# Patient Record
Sex: MALE | Race: WHITE | NOT HISPANIC OR LATINO | URBAN - METROPOLITAN AREA
[De-identification: names, ages, dates, MRNs, and addresses within clinical notes are randomized per-mention and may not be internally consistent; named-entity substitution may affect disease eponyms.]

---

## 2019-06-29 ENCOUNTER — INPATIENT (INPATIENT)
Facility: HOSPITAL | Age: 34
LOS: 1 days | Discharge: HOME | DRG: 638 | End: 2019-07-01
Attending: INTERNAL MEDICINE | Admitting: INTERNAL MEDICINE
Payer: SELF-PAY

## 2019-06-29 VITALS
HEART RATE: 128 BPM | RESPIRATION RATE: 16 BRPM | TEMPERATURE: 98 F | DIASTOLIC BLOOD PRESSURE: 77 MMHG | WEIGHT: 139.99 LBS | HEIGHT: 65 IN | OXYGEN SATURATION: 100 % | SYSTOLIC BLOOD PRESSURE: 116 MMHG

## 2019-06-29 DIAGNOSIS — E11.9 TYPE 2 DIABETES MELLITUS WITHOUT COMPLICATIONS: ICD-10-CM

## 2019-06-29 LAB
ALBUMIN SERPL ELPH-MCNC: 3.7 G/DL — SIGNIFICANT CHANGE UP (ref 3.3–5.2)
ALP SERPL-CCNC: 126 U/L — HIGH (ref 40–120)
ALT FLD-CCNC: 18 U/L — SIGNIFICANT CHANGE UP
ANION GAP SERPL CALC-SCNC: 24 MMOL/L — HIGH (ref 5–17)
ANION GAP SERPL CALC-SCNC: 46 MMOL/L — HIGH (ref 5–17)
APPEARANCE UR: CLEAR — SIGNIFICANT CHANGE UP
AST SERPL-CCNC: 14 U/L — SIGNIFICANT CHANGE UP
BACTERIA # UR AUTO: ABNORMAL
BASE EXCESS BLDV CALC-SCNC: -14.3 MMOL/L — LOW (ref -2–2)
BASOPHILS # BLD AUTO: 0 K/UL — SIGNIFICANT CHANGE UP (ref 0–0.2)
BASOPHILS NFR BLD AUTO: 0 % — SIGNIFICANT CHANGE UP (ref 0–2)
BILIRUB SERPL-MCNC: 0.9 MG/DL — SIGNIFICANT CHANGE UP (ref 0.4–2)
BILIRUB UR-MCNC: NEGATIVE — SIGNIFICANT CHANGE UP
BUN SERPL-MCNC: 54 MG/DL — HIGH (ref 8–20)
BUN SERPL-MCNC: 75 MG/DL — HIGH (ref 8–20)
CA-I SERPL-SCNC: 0.74 MMOL/L — LOW (ref 1.15–1.33)
CALCIUM SERPL-MCNC: 7.8 MG/DL — LOW (ref 8.6–10.2)
CALCIUM SERPL-MCNC: 7.8 MG/DL — LOW (ref 8.6–10.2)
CHLORIDE BLDV-SCNC: 63 MMOL/L — LOW (ref 98–107)
CHLORIDE SERPL-SCNC: 70 MMOL/L — LOW (ref 98–107)
CHLORIDE SERPL-SCNC: 92 MMOL/L — LOW (ref 98–107)
CO2 SERPL-SCNC: 20 MMOL/L — LOW (ref 22–29)
CO2 SERPL-SCNC: 8 MMOL/L — CRITICAL LOW (ref 22–29)
COLOR SPEC: YELLOW — SIGNIFICANT CHANGE UP
CREAT SERPL-MCNC: 1.52 MG/DL — HIGH (ref 0.5–1.3)
CREAT SERPL-MCNC: 2.07 MG/DL — HIGH (ref 0.5–1.3)
DIFF PNL FLD: ABNORMAL
EOSINOPHIL # BLD AUTO: 0.21 K/UL — SIGNIFICANT CHANGE UP (ref 0–0.5)
EOSINOPHIL NFR BLD AUTO: 0.9 % — SIGNIFICANT CHANGE UP (ref 0–6)
EPI CELLS # UR: SIGNIFICANT CHANGE UP
GAS PNL BLDV: 117 MMOL/L — CRITICAL LOW (ref 135–145)
GAS PNL BLDV: SIGNIFICANT CHANGE UP
GAS PNL BLDV: SIGNIFICANT CHANGE UP
GLUCOSE BLDC GLUCOMTR-MCNC: 216 MG/DL — HIGH (ref 70–99)
GLUCOSE BLDC GLUCOMTR-MCNC: 218 MG/DL — HIGH (ref 70–99)
GLUCOSE BLDC GLUCOMTR-MCNC: 239 MG/DL — HIGH (ref 70–99)
GLUCOSE BLDC GLUCOMTR-MCNC: 265 MG/DL — HIGH (ref 70–99)
GLUCOSE BLDC GLUCOMTR-MCNC: 366 MG/DL — HIGH (ref 70–99)
GLUCOSE BLDC GLUCOMTR-MCNC: 382 MG/DL — HIGH (ref 70–99)
GLUCOSE BLDV-MCNC: 693 MG/DL — CRITICAL HIGH (ref 70–99)
GLUCOSE SERPL-MCNC: 255 MG/DL — HIGH (ref 70–115)
GLUCOSE SERPL-MCNC: 646 MG/DL — CRITICAL HIGH (ref 70–115)
GLUCOSE UR QL: 1000 MG/DL
HCO3 BLDV-SCNC: 14 MMOL/L — LOW (ref 21–29)
HCT VFR BLD CALC: 47.3 % — SIGNIFICANT CHANGE UP (ref 39–50)
HGB BLD-MCNC: 15.7 G/DL — SIGNIFICANT CHANGE UP (ref 13–17)
KETONES UR-MCNC: ABNORMAL
LACTATE BLDV-MCNC: 3 MMOL/L — HIGH (ref 0.5–2)
LACTATE BLDV-MCNC: 3.9 MMOL/L — HIGH (ref 0.5–2)
LEUKOCYTE ESTERASE UR-ACNC: NEGATIVE — SIGNIFICANT CHANGE UP
LIDOCAIN IGE QN: 10 U/L — LOW (ref 22–51)
LYMPHOCYTES # BLD AUTO: 1.23 K/UL — SIGNIFICANT CHANGE UP (ref 1–3.3)
LYMPHOCYTES # BLD AUTO: 5.3 % — LOW (ref 13–44)
MAGNESIUM SERPL-MCNC: 2.6 MG/DL — SIGNIFICANT CHANGE UP (ref 1.6–2.6)
MAGNESIUM SERPL-MCNC: 2.7 MG/DL — HIGH (ref 1.6–2.6)
MANUAL SMEAR VERIFICATION: SIGNIFICANT CHANGE UP
MCHC RBC-ENTMCNC: 29.5 PG — SIGNIFICANT CHANGE UP (ref 27–34)
MCHC RBC-ENTMCNC: 33.2 GM/DL — SIGNIFICANT CHANGE UP (ref 32–36)
MCV RBC AUTO: 88.9 FL — SIGNIFICANT CHANGE UP (ref 80–100)
MONOCYTES # BLD AUTO: 1.03 K/UL — HIGH (ref 0–0.9)
MONOCYTES NFR BLD AUTO: 4.4 % — SIGNIFICANT CHANGE UP (ref 2–14)
NEUTROPHILS # BLD AUTO: 20.62 K/UL — HIGH (ref 1.8–7.4)
NEUTROPHILS NFR BLD AUTO: 82.4 % — HIGH (ref 43–77)
NEUTS BAND # BLD: 6.1 % — SIGNIFICANT CHANGE UP (ref 0–8)
NITRITE UR-MCNC: POSITIVE
PCO2 BLDV: 21 MMHG — LOW (ref 35–50)
PH BLDV: 7.29 — LOW (ref 7.32–7.43)
PH UR: 6 — SIGNIFICANT CHANGE UP (ref 5–8)
PHOSPHATE SERPL-MCNC: 0.9 MG/DL — CRITICAL LOW (ref 2.4–4.7)
PHOSPHATE SERPL-MCNC: 6.3 MG/DL — HIGH (ref 2.4–4.7)
PLAT MORPH BLD: NORMAL — SIGNIFICANT CHANGE UP
PLATELET # BLD AUTO: 386 K/UL — SIGNIFICANT CHANGE UP (ref 150–400)
PO2 BLDV: 123 MMHG — HIGH (ref 25–45)
POIKILOCYTOSIS BLD QL AUTO: SIGNIFICANT CHANGE UP
POTASSIUM BLDV-SCNC: 11.6 MMOL/L — CRITICAL HIGH (ref 3.4–4.5)
POTASSIUM SERPL-MCNC: 3.6 MMOL/L — SIGNIFICANT CHANGE UP (ref 3.5–5.3)
POTASSIUM SERPL-MCNC: 4 MMOL/L — SIGNIFICANT CHANGE UP (ref 3.5–5.3)
POTASSIUM SERPL-SCNC: 3.6 MMOL/L — SIGNIFICANT CHANGE UP (ref 3.5–5.3)
POTASSIUM SERPL-SCNC: 4 MMOL/L — SIGNIFICANT CHANGE UP (ref 3.5–5.3)
PROT SERPL-MCNC: 6.2 G/DL — LOW (ref 6.6–8.7)
PROT UR-MCNC: 30 MG/DL
RBC # BLD: 5.32 M/UL — SIGNIFICANT CHANGE UP (ref 4.2–5.8)
RBC # FLD: 11.8 % — SIGNIFICANT CHANGE UP (ref 10.3–14.5)
RBC BLD AUTO: ABNORMAL
RBC CASTS # UR COMP ASSIST: SIGNIFICANT CHANGE UP /HPF (ref 0–4)
SAO2 % BLDV: 98 % — SIGNIFICANT CHANGE UP
SODIUM SERPL-SCNC: 124 MMOL/L — LOW (ref 135–145)
SODIUM SERPL-SCNC: 136 MMOL/L — SIGNIFICANT CHANGE UP (ref 135–145)
SP GR SPEC: 1.02 — SIGNIFICANT CHANGE UP (ref 1.01–1.02)
UROBILINOGEN FLD QL: NEGATIVE MG/DL — SIGNIFICANT CHANGE UP
VARIANT LYMPHS # BLD: 0.9 % — SIGNIFICANT CHANGE UP (ref 0–6)
WBC # BLD: 23.3 K/UL — HIGH (ref 3.8–10.5)
WBC # FLD AUTO: 23.3 K/UL — HIGH (ref 3.8–10.5)
WBC UR QL: NEGATIVE — SIGNIFICANT CHANGE UP

## 2019-06-29 PROCEDURE — 71045 X-RAY EXAM CHEST 1 VIEW: CPT | Mod: 26

## 2019-06-29 PROCEDURE — 99291 CRITICAL CARE FIRST HOUR: CPT

## 2019-06-29 RX ORDER — INSULIN HUMAN 100 [IU]/ML
8 INJECTION, SOLUTION SUBCUTANEOUS
Qty: 100 | Refills: 0 | Status: DISCONTINUED | OUTPATIENT
Start: 2019-06-29 | End: 2019-06-29

## 2019-06-29 RX ORDER — ONDANSETRON 8 MG/1
8 TABLET, FILM COATED ORAL ONCE
Refills: 0 | Status: COMPLETED | OUTPATIENT
Start: 2019-06-29 | End: 2019-06-29

## 2019-06-29 RX ORDER — POTASSIUM CHLORIDE 20 MEQ
10 PACKET (EA) ORAL
Refills: 0 | Status: COMPLETED | OUTPATIENT
Start: 2019-06-29 | End: 2019-06-29

## 2019-06-29 RX ORDER — INSULIN HUMAN 100 [IU]/ML
4 INJECTION, SOLUTION SUBCUTANEOUS
Qty: 100 | Refills: 0 | Status: DISCONTINUED | OUTPATIENT
Start: 2019-06-29 | End: 2019-06-30

## 2019-06-29 RX ORDER — HEPARIN SODIUM 5000 [USP'U]/ML
5000 INJECTION INTRAVENOUS; SUBCUTANEOUS EVERY 12 HOURS
Refills: 0 | Status: DISCONTINUED | OUTPATIENT
Start: 2019-06-29 | End: 2019-07-01

## 2019-06-29 RX ORDER — SODIUM CHLORIDE 9 MG/ML
1000 INJECTION, SOLUTION INTRAVENOUS
Refills: 0 | Status: DISCONTINUED | OUTPATIENT
Start: 2019-06-29 | End: 2019-06-30

## 2019-06-29 RX ORDER — SODIUM CHLORIDE 9 MG/ML
4000 INJECTION INTRAMUSCULAR; INTRAVENOUS; SUBCUTANEOUS ONCE
Refills: 0 | Status: COMPLETED | OUTPATIENT
Start: 2019-06-29 | End: 2019-06-29

## 2019-06-29 RX ORDER — METOCLOPRAMIDE HCL 10 MG
10 TABLET ORAL EVERY 6 HOURS
Refills: 0 | Status: DISCONTINUED | OUTPATIENT
Start: 2019-06-29 | End: 2019-06-30

## 2019-06-29 RX ORDER — CHLORHEXIDINE GLUCONATE 213 G/1000ML
1 SOLUTION TOPICAL
Refills: 0 | Status: DISCONTINUED | OUTPATIENT
Start: 2019-06-29 | End: 2019-06-30

## 2019-06-29 RX ORDER — POTASSIUM PHOSPHATE, MONOBASIC POTASSIUM PHOSPHATE, DIBASIC 236; 224 MG/ML; MG/ML
30 INJECTION, SOLUTION INTRAVENOUS ONCE
Refills: 0 | Status: COMPLETED | OUTPATIENT
Start: 2019-06-29 | End: 2019-06-29

## 2019-06-29 RX ORDER — SODIUM CHLORIDE 9 MG/ML
1000 INJECTION, SOLUTION INTRAVENOUS ONCE
Refills: 0 | Status: COMPLETED | OUTPATIENT
Start: 2019-06-29 | End: 2019-06-29

## 2019-06-29 RX ADMIN — ONDANSETRON 8 MILLIGRAM(S): 8 TABLET, FILM COATED ORAL at 15:19

## 2019-06-29 RX ADMIN — SODIUM CHLORIDE 1000 MILLILITER(S): 9 INJECTION, SOLUTION INTRAVENOUS at 17:01

## 2019-06-29 RX ADMIN — HEPARIN SODIUM 5000 UNIT(S): 5000 INJECTION INTRAVENOUS; SUBCUTANEOUS at 18:07

## 2019-06-29 RX ADMIN — Medication 100 MILLIEQUIVALENT(S): at 21:50

## 2019-06-29 RX ADMIN — SODIUM CHLORIDE 200 MILLILITER(S): 9 INJECTION, SOLUTION INTRAVENOUS at 18:07

## 2019-06-29 RX ADMIN — SODIUM CHLORIDE 4000 MILLILITER(S): 9 INJECTION INTRAMUSCULAR; INTRAVENOUS; SUBCUTANEOUS at 16:59

## 2019-06-29 RX ADMIN — SODIUM CHLORIDE 2000 MILLILITER(S): 9 INJECTION INTRAMUSCULAR; INTRAVENOUS; SUBCUTANEOUS at 15:03

## 2019-06-29 RX ADMIN — Medication 10 MILLIGRAM(S): at 18:07

## 2019-06-29 RX ADMIN — INSULIN HUMAN 8 UNIT(S)/HR: 100 INJECTION, SOLUTION SUBCUTANEOUS at 15:30

## 2019-06-29 RX ADMIN — Medication 100 MILLIEQUIVALENT(S): at 20:19

## 2019-06-29 RX ADMIN — Medication 100 MILLIEQUIVALENT(S): at 18:07

## 2019-06-29 NOTE — ED PROVIDER NOTE - CLINICAL SUMMARY MEDICAL DECISION MAKING FREE TEXT BOX
Patient presntes with S/Sx c/w DKA.  lab values with PRISCILA and otherwise c/w DKA with elevated AG.  will defer to ICU team regarding abx.  insulin drip started.  IVF resuscitation started.  repeat K+ is WNL.  d/w ICU and will admit.

## 2019-06-29 NOTE — H&P ADULT - HISTORY OF PRESENT ILLNESS
33 y/o M with a h/o DM1 presents to the ED with n/v and abdominal pain. Reports noncompliance with his insulin over the past several days. When asked why he just shrugged. Actively vomiting and doubled over in pain at time of exam. B. A. pH: 7.29. He has had recurrent admissions here at [facility] for DKA. Aggressively volume resuscitated and started on an insulin infusion. 35 y/o M with a h/o DM presents to the ED with n/v and abdominal pain. Reports noncompliance with his insulin over the past several days. When asked why he just shrugged. Actively vomiting and doubled over in pain at time of exam. B. A. pH: 7.29. He has had recurrent admissions here at [facility] for DKA. Aggressively volume resuscitated and started on an insulin infusion.

## 2019-06-29 NOTE — H&P ADULT - PROBLEM SELECTOR PLAN 2
Secondary to pre-renal azotemia due to dehydration from osmotic diuresis. Continue aggressive IVF hydration. Trend BUN/Cr. Monitor lytes and UOP. Given K for low normal level in the setting of insulin infusion. Avoid nephrotoxic agents.

## 2019-06-29 NOTE — H&P ADULT - PROBLEM SELECTOR PLAN 1
Secondary to insulin noncompliance. Titrating insulin infusion with q 1 hour accu-checks. Got 5L IVF bolus at this point. Start LR @ 200cc/hr. Transition to D5+LR when -250. Routine Reglan for antiemesis and pro-kinetic effect. Trend BMP q 6 hours and follow AG- when closed bridge off insulin infusion with Lantus and start ISS.

## 2019-06-29 NOTE — ED ADULT TRIAGE NOTE - CHIEF COMPLAINT QUOTE
Hx diabetes, doesn't know when he last used his insulin.  Vomiting X days.  Tachycardic.  Hx of gastroparesis.  Finger stick "Hi" in triage.

## 2019-06-29 NOTE — H&P ADULT - ATTENDING COMMENTS
PT admitted by [facility], I have seen and evaluated this patient independently and agree with the above findings: 34 year old male with DM I noncompliant with insulin presented in DKA, continue volume resusc, IV insulin overlap with long acting insulin after gap closure.

## 2019-06-29 NOTE — ED PROVIDER NOTE - OBJECTIVE STATEMENT
Pertinent PMH/PSH/FHx/SHx and Review of Systems contained within:  Patient presents to the ED for NV with tachycardia and "I feel like I'm dying."  PMH of long standing IDDM2 with multiple prior episdoes of DKA.  Patient states "I'm in DKA."  Per patient, stopped taking insulin for the past 4-5 days and then began also vomiting.  no focal trauma.  no focal illness complaints.  no focal abd pain.  no other concerns.  VSS but tachy to 120s and FS is "high high."  Does smoke marijuana but denies other drug/EtOH use.  Non toxic.  No aggravating or relieving factors. No other pertinent PMH.  No other pertinent PSH.  No other pertinent FHx.  Patient denies EtOH/tobacco substance use. No fever/chills, No photophobia/eye pain/changes in vision, No ear pain/sore throat/dysphagia, No chest pain/palpitations, no SOB/cough/wheeze/stridor, no diarrhea, no dysuria/frequency/discharge, No neck/back pain, no rash, no changes in neurological status/function.

## 2019-06-29 NOTE — H&P ADULT - ASSESSMENT
33 y/o M with a h/o DM1 with DKA, ERGINO.    Case discussed in detail with MICU attending, Dr. Gibson. 33 y/o M with a h/o DM with DKA, REGINO.    Case discussed in detail with MICU attending, Dr. Gibson.

## 2019-06-29 NOTE — ED PROVIDER NOTE - EKG ADDITIONAL INFORMATION FREE TEXT
As interpreted by ED physician, ECG is sinus tach with normal intervals/axis, no changes in QRS, no ST/T changes.

## 2019-06-29 NOTE — ED PROVIDER NOTE - PROGRESS NOTE DETAILS
Patient with Na of 117 and corrected at ~128-129.  elevated lactic and hyperkalemia.  Lab reports unable to accurately report K+ because whole blood sample cannot detect hemolysis.  IVF resuscitation underway.  started insulin drip as well at 0.13 units/kg. Multiple labs hemolyzed and astick performed for labs

## 2019-06-29 NOTE — ED PROVIDER NOTE - PHYSICAL EXAMINATION
Gen: Alert, NAD  Head: NC, AT, PERRL, EOMI, normal lids/conjunctiva  ENT: normal hearing, patent oropharynx without erythema/exudate, uvula midline  Neck: +supple, no tenderness/meningismus/JVD, +Trachea midline  Pulm: Bilateral BS, normal resp effort, no wheeze/stridor/retractions  CV: tachy to 120 but regular, no R/G, +dist pulses  Abd: soft, NT/ND, +BS, no hepatosplenomegaly  Mskel: no edema/erythema/cyanosis  Skin: no rash  Neuro: AAOx3, no gross sensory/motor deficits, CN 2-12 intact

## 2019-06-30 DIAGNOSIS — E11.10 TYPE 2 DIABETES MELLITUS WITH KETOACIDOSIS WITHOUT COMA: ICD-10-CM

## 2019-06-30 DIAGNOSIS — N17.9 ACUTE KIDNEY FAILURE, UNSPECIFIED: ICD-10-CM

## 2019-06-30 LAB
ANION GAP SERPL CALC-SCNC: 11 MMOL/L — SIGNIFICANT CHANGE UP (ref 5–17)
B-OH-BUTYR SERPL-SCNC: 15.6 MMOL/L — HIGH
BUN SERPL-MCNC: 42 MG/DL — HIGH (ref 8–20)
CALCIUM SERPL-MCNC: 8.1 MG/DL — LOW (ref 8.6–10.2)
CHLORIDE SERPL-SCNC: 98 MMOL/L — SIGNIFICANT CHANGE UP (ref 98–107)
CO2 SERPL-SCNC: 26 MMOL/L — SIGNIFICANT CHANGE UP (ref 22–29)
CREAT SERPL-MCNC: 1.31 MG/DL — HIGH (ref 0.5–1.3)
CULTURE RESULTS: SIGNIFICANT CHANGE UP
GLUCOSE BLDC GLUCOMTR-MCNC: 184 MG/DL — HIGH (ref 70–99)
GLUCOSE BLDC GLUCOMTR-MCNC: 188 MG/DL — HIGH (ref 70–99)
GLUCOSE BLDC GLUCOMTR-MCNC: 196 MG/DL — HIGH (ref 70–99)
GLUCOSE BLDC GLUCOMTR-MCNC: 199 MG/DL — HIGH (ref 70–99)
GLUCOSE BLDC GLUCOMTR-MCNC: 202 MG/DL — HIGH (ref 70–99)
GLUCOSE BLDC GLUCOMTR-MCNC: 203 MG/DL — HIGH (ref 70–99)
GLUCOSE BLDC GLUCOMTR-MCNC: 217 MG/DL — HIGH (ref 70–99)
GLUCOSE BLDC GLUCOMTR-MCNC: 223 MG/DL — HIGH (ref 70–99)
GLUCOSE BLDC GLUCOMTR-MCNC: 224 MG/DL — HIGH (ref 70–99)
GLUCOSE BLDC GLUCOMTR-MCNC: 241 MG/DL — HIGH (ref 70–99)
GLUCOSE BLDC GLUCOMTR-MCNC: 250 MG/DL — HIGH (ref 70–99)
GLUCOSE BLDC GLUCOMTR-MCNC: 86 MG/DL — SIGNIFICANT CHANGE UP (ref 70–99)
GLUCOSE SERPL-MCNC: 216 MG/DL — HIGH (ref 70–115)
HCT VFR BLD CALC: 34.7 % — LOW (ref 39–50)
HGB BLD-MCNC: 12.3 G/DL — LOW (ref 13–17)
MAGNESIUM SERPL-MCNC: 2.4 MG/DL — SIGNIFICANT CHANGE UP (ref 1.6–2.6)
MCHC RBC-ENTMCNC: 30.1 PG — SIGNIFICANT CHANGE UP (ref 27–34)
MCHC RBC-ENTMCNC: 35.4 GM/DL — SIGNIFICANT CHANGE UP (ref 32–36)
MCV RBC AUTO: 84.8 FL — SIGNIFICANT CHANGE UP (ref 80–100)
PHOSPHATE SERPL-MCNC: 1.6 MG/DL — LOW (ref 2.4–4.7)
PLATELET # BLD AUTO: 282 K/UL — SIGNIFICANT CHANGE UP (ref 150–400)
POTASSIUM SERPL-MCNC: 4 MMOL/L — SIGNIFICANT CHANGE UP (ref 3.5–5.3)
POTASSIUM SERPL-SCNC: 4 MMOL/L — SIGNIFICANT CHANGE UP (ref 3.5–5.3)
RBC # BLD: 4.09 M/UL — LOW (ref 4.2–5.8)
RBC # FLD: 11.6 % — SIGNIFICANT CHANGE UP (ref 10.3–14.5)
SODIUM SERPL-SCNC: 135 MMOL/L — SIGNIFICANT CHANGE UP (ref 135–145)
SPECIMEN SOURCE: SIGNIFICANT CHANGE UP
WBC # BLD: 20.22 K/UL — HIGH (ref 3.8–10.5)
WBC # FLD AUTO: 20.22 K/UL — HIGH (ref 3.8–10.5)

## 2019-06-30 PROCEDURE — 99223 1ST HOSP IP/OBS HIGH 75: CPT

## 2019-06-30 PROCEDURE — 99253 IP/OBS CNSLTJ NEW/EST LOW 45: CPT

## 2019-06-30 PROCEDURE — 99233 SBSQ HOSP IP/OBS HIGH 50: CPT

## 2019-06-30 RX ORDER — SODIUM CHLORIDE 9 MG/ML
1000 INJECTION, SOLUTION INTRAVENOUS
Refills: 0 | Status: DISCONTINUED | OUTPATIENT
Start: 2019-06-30 | End: 2019-07-01

## 2019-06-30 RX ORDER — DEXTROSE 50 % IN WATER 50 %
25 SYRINGE (ML) INTRAVENOUS ONCE
Refills: 0 | Status: DISCONTINUED | OUTPATIENT
Start: 2019-06-30 | End: 2019-07-01

## 2019-06-30 RX ORDER — GLUCAGON INJECTION, SOLUTION 0.5 MG/.1ML
1 INJECTION, SOLUTION SUBCUTANEOUS ONCE
Refills: 0 | Status: DISCONTINUED | OUTPATIENT
Start: 2019-06-30 | End: 2019-07-01

## 2019-06-30 RX ORDER — INSULIN LISPRO 100/ML
7 VIAL (ML) SUBCUTANEOUS
Refills: 0 | Status: DISCONTINUED | OUTPATIENT
Start: 2019-06-30 | End: 2019-07-01

## 2019-06-30 RX ORDER — INSULIN GLARGINE 100 [IU]/ML
20 INJECTION, SOLUTION SUBCUTANEOUS EVERY MORNING
Refills: 0 | Status: DISCONTINUED | OUTPATIENT
Start: 2019-06-30 | End: 2019-07-01

## 2019-06-30 RX ORDER — DEXTROSE 50 % IN WATER 50 %
12.5 SYRINGE (ML) INTRAVENOUS ONCE
Refills: 0 | Status: DISCONTINUED | OUTPATIENT
Start: 2019-06-30 | End: 2019-07-01

## 2019-06-30 RX ORDER — INSULIN LISPRO 100/ML
VIAL (ML) SUBCUTANEOUS
Refills: 0 | Status: DISCONTINUED | OUTPATIENT
Start: 2019-06-30 | End: 2019-07-01

## 2019-06-30 RX ORDER — DEXTROSE 50 % IN WATER 50 %
15 SYRINGE (ML) INTRAVENOUS ONCE
Refills: 0 | Status: DISCONTINUED | OUTPATIENT
Start: 2019-06-30 | End: 2019-07-01

## 2019-06-30 RX ADMIN — Medication 62.5 MILLIMOLE(S): at 06:22

## 2019-06-30 RX ADMIN — Medication 7 UNIT(S): at 08:11

## 2019-06-30 RX ADMIN — Medication 10 MILLIGRAM(S): at 05:17

## 2019-06-30 RX ADMIN — INSULIN GLARGINE 20 UNIT(S): 100 INJECTION, SOLUTION SUBCUTANEOUS at 06:22

## 2019-06-30 RX ADMIN — HEPARIN SODIUM 5000 UNIT(S): 5000 INJECTION INTRAVENOUS; SUBCUTANEOUS at 05:17

## 2019-06-30 RX ADMIN — SODIUM CHLORIDE 200 MILLILITER(S): 9 INJECTION, SOLUTION INTRAVENOUS at 08:12

## 2019-06-30 RX ADMIN — SODIUM CHLORIDE 125 MILLILITER(S): 9 INJECTION, SOLUTION INTRAVENOUS at 21:39

## 2019-06-30 RX ADMIN — Medication 2: at 08:11

## 2019-06-30 RX ADMIN — Medication 7 UNIT(S): at 11:47

## 2019-06-30 RX ADMIN — POTASSIUM PHOSPHATE, MONOBASIC POTASSIUM PHOSPHATE, DIBASIC 83.33 MILLIMOLE(S): 236; 224 INJECTION, SOLUTION INTRAVENOUS at 00:13

## 2019-06-30 RX ADMIN — Medication 2: at 11:47

## 2019-06-30 RX ADMIN — Medication 10 MILLIGRAM(S): at 00:13

## 2019-06-30 NOTE — CONSULT NOTE ADULT - SUBJECTIVE AND OBJECTIVE BOX
Patient is a 34y old  Male who presents with a chief complaint of DKA (2019 08:28)      HPI:  35 y/o M with a h/o DM presents to the ED with n/v and abdominal pain. Reports noncompliance with his insulin over the past several days. When asked why he just shrugged. Actively vomiting and doubled over in pain at time of exam. B. A. pH: 7.29. He has had recurrent admissions here at [facility] for DKA. Aggressively volume resuscitated and started on an insulin infusion. (2019 19:55)      PAST MEDICAL & SURGICAL HISTORY:  Diabetes      FAMILY HISTORY: no family history of DM      SOCIAL HISTORY: single, denied smoking or alcohol abuse    Allergies    No Known Allergies    Intolerances          MEDICATIONS  (STANDING):  dextrose 5%. 1000 milliLiter(s) (50 mL/Hr) IV Continuous <Continuous>  dextrose 50% Injectable 12.5 Gram(s) IV Push once  dextrose 50% Injectable 25 Gram(s) IV Push once  dextrose 50% Injectable 25 Gram(s) IV Push once  heparin  Injectable 5000 Unit(s) SubCutaneous every 12 hours  insulin glargine Injectable (LANTUS) 20 Unit(s) SubCutaneous every morning  insulin lispro (HumaLOG) corrective regimen sliding scale   SubCutaneous three times a day before meals  insulin lispro Injectable (HumaLOG) 7 Unit(s) SubCutaneous three times a day before meals  lactated ringers. 1000 milliLiter(s) (200 mL/Hr) IV Continuous <Continuous>    MEDICATIONS  (PRN):  dextrose 40% Gel 15 Gram(s) Oral once PRN Blood Glucose LESS THAN 70 milliGRAM(s)/deciliter  glucagon  Injectable 1 milliGRAM(s) IntraMuscular once PRN Glucose LESS THAN 70 milligrams/deciliter          Endocrine/Metabolic:  dextrose 40% Gel 15 Gram(s) Oral once PRN  dextrose 50% Injectable 12.5 Gram(s) IV Push once  dextrose 50% Injectable 25 Gram(s) IV Push once  dextrose 50% Injectable 25 Gram(s) IV Push once  glucagon  Injectable 1 milliGRAM(s) IntraMuscular once PRN  insulin glargine Injectable (LANTUS) 20 Unit(s) SubCutaneous every morning  insulin lispro (HumaLOG) corrective regimen sliding scale   SubCutaneous three times a day before meals  insulin lispro Injectable (HumaLOG) 7 Unit(s) SubCutaneous three times a day before meals      Nutrition/Electrolytes:  dextrose 5%. 1000 milliLiter(s) IV Continuous <Continuous>  lactated ringers. 1000 milliLiter(s) IV Continuous <Continuous>          Vital Signs Last 24 Hrs  T(C): 36.6 (2019 12:00), Max: 37.2 (2019 00:00)  T(F): 97.9 (2019 12:00), Max: 98.9 (2019 00:00)  HR: 95 (2019 12:00) (93 - 137)  BP: 135/81 (2019 12:00) (116/77 - 145/86)  BP(mean): 101 (2019 12:00) (87 - 113)  RR: 14 (2019 12:00) (13 - 31)  SpO2: 97% (2019 12:00) (94% - 100%)    LABS:                        12.3   20.22 )-----------( 282      ( 2019 04:26 )             34.7     06-30    135  |  98  |  42.0<H>  ----------------------------<  216<H>  4.0   |  26.0  |  1.31<H>    Ca    8.1<L>      2019 04:22  Phos  1.6     06-30  Mg     2.4     06-30    TPro  6.2<L>  /  Alb  3.7  /  TBili  0.9  /  DBili  x   /  AST  14  /  ALT  18  /  AlkPhos  126<H>  29      Urinalysis Basic - ( 2019 16:56 )    Color: Yellow / Appearance: Clear / S.025 / pH: x  Gluc: x / Ketone: Large  / Bili: Negative / Urobili: Negative mg/dL   Blood: x / Protein: 30 mg/dL / Nitrite: Positive   Leuk Esterase: Negative / RBC: 0-2 /HPF / WBC Negative   Sq Epi: x / Non Sq Epi: Occasional / Bacteria: Occasional        RADIOLOGY & ADDITIONAL STUDIES:      REVIEW OF SYSTEMS:    Denied chest pain, SOB, palpitation, abd. pain, nausea, vomiting, headache, dizziness, numbness, tingling, urinary and bowel complaints.         PHYSICAL EXAM:    GENERAL: NAD, well-groomed, well-developed  HEAD:  Atraumatic, Normocephalic  EYES: EOMI, PERRLA,   NECK: Supple, No JVD, Normal thyroid  NERVOUS SYSTEM:  Alert & Oriented X3, Good concentration; Motor Strength 5/5 B/L upper and lower extremities; DTRs 2+ intact and symmetric  CHEST/LUNG: CTA b/l   HEART: s1/s2 audible  ABDOMEN: Soft, Nontender, Nondistended; Bowel sounds present  EXTREMITIES:  no edema  LYMPH: No lymphadenopathy noted  SKIN: No rashes or lesions      radiology     < from: Xray Chest 1 View- PORTABLE-Urgent (19 @ 15:20) >  IMPRESSION:     No focal consolidation or pleural effusion.    < end of copied text >

## 2019-06-30 NOTE — CONSULT NOTE ADULT - ASSESSMENT
34 year old male admitted with T1DM admitted with DKA due to nonadherence with outpatien insulin regimen.  DKA resolved after insulin infusion, IV hydration and downgraded to floors.  History per chart - pt refused consult.  - cont current insulin regimen per primary team, hypoglycemic protocol  - diabetic education to reinforce outpt adherence  - reconsult when patient agrees to be seen by endocrinology.

## 2019-06-30 NOTE — PROVIDER CONTACT NOTE (EICU) - SITUATION
33 y/o M with a h/o DM with DKA, REGINO. Currently on insulin infusion w/ AG closed to 11 as such will order lantus weight based as pt does not routinely take insulin due to lack of insurance. Plan to continue insulin infusion for 2hour post administration. Will add insulin ACHS plus insulin corrective scale and advance to diabetic diet.

## 2019-06-30 NOTE — PROGRESS NOTE ADULT - SUBJECTIVE AND OBJECTIVE BOX
Patient is a 34y old  Male who presents with a chief complaint of DKA (2019 19:55)      BRIEF HOSPITAL COURSE: ***    Events last 24 hours: ***    PAST MEDICAL & SURGICAL HISTORY:  Diabetes        Medications:            heparin  Injectable 5000 Unit(s) SubCutaneous every 12 hours        dextrose 40% Gel 15 Gram(s) Oral once PRN  dextrose 50% Injectable 12.5 Gram(s) IV Push once  dextrose 50% Injectable 25 Gram(s) IV Push once  dextrose 50% Injectable 25 Gram(s) IV Push once  glucagon  Injectable 1 milliGRAM(s) IntraMuscular once PRN  insulin glargine Injectable (LANTUS) 20 Unit(s) SubCutaneous every morning  insulin lispro (HumaLOG) corrective regimen sliding scale   SubCutaneous three times a day before meals  insulin lispro Injectable (HumaLOG) 7 Unit(s) SubCutaneous three times a day before meals    dextrose 5% + sodium chloride 0.45%. 1000 milliLiter(s) IV Continuous <Continuous>  dextrose 5%. 1000 milliLiter(s) IV Continuous <Continuous>  lactated ringers. 1000 milliLiter(s) IV Continuous <Continuous>      chlorhexidine 2% Cloths 1 Application(s) Topical <User Schedule>            ICU Vital Signs Last 24 Hrs  T(C): 37.2 (2019 00:00), Max: 37.2 (2019 00:00)  T(F): 98.9 (2019 00:00), Max: 98.9 (2019 00:00)  HR: 102 (2019 08:00) (93 - 137)  BP: 132/79 (2019 08:00) (116/77 - 145/86)  BP(mean): 99 (2019 08:00) (87 - 113)  ABP: --  ABP(mean): --  RR: 23 (2019 08:00) (13 - 31)  SpO2: 96% (2019 08:00) (94% - 100%)          I&O's Detail    2019 07:01  -  2019 07:00  --------------------------------------------------------  IN:    dextrose 5% + sodium chloride 0.45%.: 1800 mL    insulin regular Infusion: 38 mL    insulin regular Infusion: 16 mL    lactated ringers.: 800 mL    Solution: 499.8 mL    Solution: 300 mL    Solution: 62.5 mL  Total IN: 3516.3 mL    OUT:    Voided: 4050 mL  Total OUT: 4050 mL    Total NET: -533.7 mL            LABS:                        12.3   20.22 )-----------( 282      ( 2019 04:26 )             34.7     06-30    135  |  98  |  42.0<H>  ----------------------------<  216<H>  4.0   |  26.0  |  1.31<H>    Ca    8.1<L>      2019 04:22  Phos  1.6       Mg     2.4         TPro  6.2<L>  /  Alb  3.7  /  TBili  0.9  /  DBili  x   /  AST  14  /  ALT  18  /  AlkPhos  126<H>            CAPILLARY BLOOD GLUCOSE      POCT Blood Glucose.: 223 mg/dL (2019 08:08)      Urinalysis Basic - ( 2019 16:56 )    Color: Yellow / Appearance: Clear / S.025 / pH: x  Gluc: x / Ketone: Large  / Bili: Negative / Urobili: Negative mg/dL   Blood: x / Protein: 30 mg/dL / Nitrite: Positive   Leuk Esterase: Negative / RBC: 0-2 /HPF / WBC Negative   Sq Epi: x / Non Sq Epi: Occasional / Bacteria: Occasional      CULTURES:      Physical Examination:    General: No acute distress.      HEENT: Pupils equal, reactive to light.  Symmetric.    PULM: Clear to auscultation bilaterally, no significant sputum production    NECK: Supple, no lymphadenopathy, trachea midline    CVS: Regular rate and rhythm, no murmurs, rubs, or gallops    GI: Soft, nondistended, nontender, normoactive bowel sounds, no masses    EXT: No edema, nontender    SKIN: Warm and well perfused, no rashes noted.    NEURO: Alert, oriented, interactive, nonfocal    DEVICES:     RADIOLOGY:     CRITICAL CARE TIME SPENT:

## 2019-06-30 NOTE — CONSULT NOTE ADULT - SUBJECTIVE AND OBJECTIVE BOX
HPI:  35 y/o M with a h/o DM presents to the ED with n/v and abdominal pain. Reports noncompliance with his insulin over the past several days. When asked why he just shrugged. Actively vomiting and doubled over in pain at time of exam. B. A. pH: 7.29. He has had recurrent admissions here at [facility] for DKA. Aggressively volume resuscitated and started on an insulin infusion. (2019 19:55)    History per chart.  When approad patient and introduced myself - he stated "get out, I don't want to talk to you"      PAST MEDICAL & SURGICAL HISTORY:  Diabetes    REVIEW OF SYSTEMS: unable to assess      MEDICATIONS  (STANDING):  dextrose 5%. 1000 milliLiter(s) (50 mL/Hr) IV Continuous <Continuous>  dextrose 50% Injectable 12.5 Gram(s) IV Push once  dextrose 50% Injectable 25 Gram(s) IV Push once  dextrose 50% Injectable 25 Gram(s) IV Push once  heparin  Injectable 5000 Unit(s) SubCutaneous every 12 hours  insulin glargine Injectable (LANTUS) 20 Unit(s) SubCutaneous every morning  insulin lispro (HumaLOG) corrective regimen sliding scale   SubCutaneous three times a day before meals  insulin lispro Injectable (HumaLOG) 7 Unit(s) SubCutaneous three times a day before meals  lactated ringers. 1000 milliLiter(s) (125 mL/Hr) IV Continuous <Continuous>    MEDICATIONS  (PRN):  dextrose 40% Gel 15 Gram(s) Oral once PRN Blood Glucose LESS THAN 70 milliGRAM(s)/deciliter  glucagon  Injectable 1 milliGRAM(s) IntraMuscular once PRN Glucose LESS THAN 70 milligrams/deciliter      Allergies  No Known Allergies    PHYSICAL EXAM: pt refused    Vital Signs Last 24 Hrs  T(C): 36.6 (2019 15:03), Max: 37.2 (2019 00:00)  T(F): 97.9 (2019 15:03), Max: 98.9 (2019 00:00)  HR: 89 (2019 15:03) (89 - 135)  BP: 120/75 (2019 15:03) (120/75 - 145/86)  BP(mean): 101 (2019 12:00) (87 - 109)  RR: 18 (2019 15:03) (13 - 28)  SpO2: 97% (2019 15:03) (94% - 100%)        LABS:                        12.3   20.22 )-----------( 282      ( 2019 04:26 )             34.7     06-30    135  |  98  |  42.0<H>  ----------------------------<  216<H>  4.0   |  26.0  |  1.31<H>    Ca    8.1<L>      2019 04:22  Phos  1.6     30  Mg     2.4     0630    TPro  6.2<L>  /  Alb  3.7  /  TBili  0.9  /  DBili  x   /  AST  14  /  ALT  18  /  AlkPhos  126<H>  29    Urinalysis Basic - ( 2019 16:56 )    Color: Yellow / Appearance: Clear / S.025 / pH: x  Gluc: x / Ketone: Large  / Bili: Negative / Urobili: Negative mg/dL   Blood: x / Protein: 30 mg/dL / Nitrite: Positive   Leuk Esterase: Negative / RBC: 0-2 /HPF / WBC Negative   Sq Epi: x / Non Sq Epi: Occasional / Bacteria: Occasional      LIVER FUNCTIONS - ( 2019 15:57 )  Alb: 3.7 g/dL / Pro: 6.2 g/dL / ALK PHOS: 126 U/L / ALT: 18 U/L / AST: 14 U/L / GGT: x           CAPILLARY BLOOD GLUCOSE  POCT Blood Glucose.: 86 mg/dL (2019 16:26)  POCT Blood Glucose.: 203 mg/dL (2019 11:33)  POCT Blood Glucose.: 223 mg/dL (2019 08:08)  POCT Blood Glucose.: 224 mg/dL (2019 06:54)  POCT Blood Glucose.: 217 mg/dL (2019 06:21)  POCT Blood Glucose.: 202 mg/dL (2019 05:16)  POCT Blood Glucose.: 196 mg/dL (2019 04:25)  POCT Blood Glucose.: 184 mg/dL (2019 03:25)  POCT Blood Glucose.: 188 mg/dL (2019 02:33)  POCT Blood Glucose.: 241 mg/dL (2019 01:19)  POCT Blood Glucose.: 250 mg/dL (2019 00:15)  POCT Blood Glucose.: 218 mg/dL (2019 23:17)  POCT Blood Glucose.: 216 mg/dL (2019 22:06)  POCT Blood Glucose.: 239 mg/dL (2019 21:19)  POCT Blood Glucose.: 265 mg/dL (2019 20:18)

## 2019-06-30 NOTE — CONSULT NOTE ADULT - ASSESSMENT
This is 35YO man with PMH of Type 1 DM admitted to ICU due to DKA due to medication non compliance treated with IV insulin, anion gap closed and downgraded to Hospitalist service.    A/P    >DKA - resolved   started on lantus 20 units and humalog premeal   FS and sliding scale  A1c in am   counseled about medication compliance    >DM - uncontrolled - as above   endo consult in am     >REGINO - due to dehydration  c/w IVF. f/u BMP     >Leucocytosis - likely reactive  afebrile, xray negative , u/a no pyouria    >DVt PPX - heparin

## 2019-07-01 VITALS
OXYGEN SATURATION: 97 % | TEMPERATURE: 99 F | RESPIRATION RATE: 19 BRPM | DIASTOLIC BLOOD PRESSURE: 84 MMHG | HEART RATE: 88 BPM | SYSTOLIC BLOOD PRESSURE: 136 MMHG

## 2019-07-01 LAB
ANION GAP SERPL CALC-SCNC: 15 MMOL/L — SIGNIFICANT CHANGE UP (ref 5–17)
BUN SERPL-MCNC: 17 MG/DL — SIGNIFICANT CHANGE UP (ref 8–20)
CALCIUM SERPL-MCNC: 8.8 MG/DL — SIGNIFICANT CHANGE UP (ref 8.6–10.2)
CHLORIDE SERPL-SCNC: 92 MMOL/L — LOW (ref 98–107)
CO2 SERPL-SCNC: 28 MMOL/L — SIGNIFICANT CHANGE UP (ref 22–29)
CREAT SERPL-MCNC: 0.77 MG/DL — SIGNIFICANT CHANGE UP (ref 0.5–1.3)
GLUCOSE BLDC GLUCOMTR-MCNC: 131 MG/DL — HIGH (ref 70–99)
GLUCOSE BLDC GLUCOMTR-MCNC: 201 MG/DL — HIGH (ref 70–99)
GLUCOSE BLDC GLUCOMTR-MCNC: 262 MG/DL — HIGH (ref 70–99)
GLUCOSE SERPL-MCNC: 308 MG/DL — HIGH (ref 70–115)
HBA1C BLD-MCNC: 8.7 % — HIGH (ref 4–5.6)
HCT VFR BLD CALC: 43 % — SIGNIFICANT CHANGE UP (ref 39–50)
HGB BLD-MCNC: 14.2 G/DL — SIGNIFICANT CHANGE UP (ref 13–17)
MCHC RBC-ENTMCNC: 30.1 PG — SIGNIFICANT CHANGE UP (ref 27–34)
MCHC RBC-ENTMCNC: 33 GM/DL — SIGNIFICANT CHANGE UP (ref 32–36)
MCV RBC AUTO: 91.1 FL — SIGNIFICANT CHANGE UP (ref 80–100)
PLATELET # BLD AUTO: 245 K/UL — SIGNIFICANT CHANGE UP (ref 150–400)
POTASSIUM SERPL-MCNC: 4.9 MMOL/L — SIGNIFICANT CHANGE UP (ref 3.5–5.3)
POTASSIUM SERPL-SCNC: 4.9 MMOL/L — SIGNIFICANT CHANGE UP (ref 3.5–5.3)
RBC # BLD: 4.72 M/UL — SIGNIFICANT CHANGE UP (ref 4.2–5.8)
RBC # FLD: 11.9 % — SIGNIFICANT CHANGE UP (ref 10.3–14.5)
SODIUM SERPL-SCNC: 135 MMOL/L — SIGNIFICANT CHANGE UP (ref 135–145)
WBC # BLD: 10.1 K/UL — SIGNIFICANT CHANGE UP (ref 3.8–10.5)
WBC # FLD AUTO: 10.1 K/UL — SIGNIFICANT CHANGE UP (ref 3.8–10.5)

## 2019-07-01 PROCEDURE — 96361 HYDRATE IV INFUSION ADD-ON: CPT

## 2019-07-01 PROCEDURE — 83605 ASSAY OF LACTIC ACID: CPT

## 2019-07-01 PROCEDURE — 84295 ASSAY OF SERUM SODIUM: CPT

## 2019-07-01 PROCEDURE — 99239 HOSP IP/OBS DSCHRG MGMT >30: CPT

## 2019-07-01 PROCEDURE — 83735 ASSAY OF MAGNESIUM: CPT

## 2019-07-01 PROCEDURE — 96375 TX/PRO/DX INJ NEW DRUG ADDON: CPT

## 2019-07-01 PROCEDURE — 85027 COMPLETE CBC AUTOMATED: CPT

## 2019-07-01 PROCEDURE — 80048 BASIC METABOLIC PNL TOTAL CA: CPT

## 2019-07-01 PROCEDURE — 36415 COLL VENOUS BLD VENIPUNCTURE: CPT

## 2019-07-01 PROCEDURE — 81001 URINALYSIS AUTO W/SCOPE: CPT

## 2019-07-01 PROCEDURE — 99285 EMERGENCY DEPT VISIT HI MDM: CPT | Mod: 25

## 2019-07-01 PROCEDURE — 83036 HEMOGLOBIN GLYCOSYLATED A1C: CPT

## 2019-07-01 PROCEDURE — 84436 ASSAY OF TOTAL THYROXINE: CPT

## 2019-07-01 PROCEDURE — 80053 COMPREHEN METABOLIC PANEL: CPT

## 2019-07-01 PROCEDURE — 96374 THER/PROPH/DIAG INJ IV PUSH: CPT

## 2019-07-01 PROCEDURE — 84443 ASSAY THYROID STIM HORMONE: CPT

## 2019-07-01 PROCEDURE — 85014 HEMATOCRIT: CPT

## 2019-07-01 PROCEDURE — 83690 ASSAY OF LIPASE: CPT

## 2019-07-01 PROCEDURE — 84100 ASSAY OF PHOSPHORUS: CPT

## 2019-07-01 PROCEDURE — 82010 KETONE BODYS QUAN: CPT

## 2019-07-01 PROCEDURE — 71045 X-RAY EXAM CHEST 1 VIEW: CPT

## 2019-07-01 PROCEDURE — 82330 ASSAY OF CALCIUM: CPT

## 2019-07-01 PROCEDURE — 82947 ASSAY GLUCOSE BLOOD QUANT: CPT

## 2019-07-01 PROCEDURE — 84132 ASSAY OF SERUM POTASSIUM: CPT

## 2019-07-01 PROCEDURE — 82962 GLUCOSE BLOOD TEST: CPT

## 2019-07-01 PROCEDURE — 82803 BLOOD GASES ANY COMBINATION: CPT

## 2019-07-01 PROCEDURE — 82435 ASSAY OF BLOOD CHLORIDE: CPT

## 2019-07-01 PROCEDURE — 87086 URINE CULTURE/COLONY COUNT: CPT

## 2019-07-01 RX ORDER — INSULIN NPH HUM/REG INSULIN HM 70-30/ML
30 VIAL (ML) SUBCUTANEOUS
Qty: 2 | Refills: 0
Start: 2019-07-01 | End: 2019-07-30

## 2019-07-01 RX ADMIN — SODIUM CHLORIDE 125 MILLILITER(S): 9 INJECTION, SOLUTION INTRAVENOUS at 08:05

## 2019-07-01 RX ADMIN — Medication 7 UNIT(S): at 11:49

## 2019-07-01 RX ADMIN — Medication 3: at 07:51

## 2019-07-01 RX ADMIN — SODIUM CHLORIDE 125 MILLILITER(S): 9 INJECTION, SOLUTION INTRAVENOUS at 05:51

## 2019-07-01 RX ADMIN — Medication 7 UNIT(S): at 07:52

## 2019-07-01 RX ADMIN — INSULIN GLARGINE 20 UNIT(S): 100 INJECTION, SOLUTION SUBCUTANEOUS at 08:04

## 2019-07-01 RX ADMIN — Medication 2: at 11:48

## 2019-07-01 NOTE — DISCHARGE NOTE PROVIDER - HOSPITAL COURSE
This is 33YO man with PMH of Type 1 DM admitted to ICU due to DKA due to medication non compliance treated with IV insulin, anion gap closed and downgraded to Hospitalist service. Pt is feeling better, appetite improving. Pt has no insurance, Maya ramsey requested - will start Novolin 70/30 30 units qam and 20 units qpm. Pt is counseled about medication compliance.         A/P        >DKA - resolved     pt has no insurance, Novolin 70/30 30 units QAM, 20 units QPM    A1c 8.7    counseled about medication compliance        >REGINO - Resolved            ICU Vital Signs Last 24 Hrs    T(C): 36.7 (01 Jul 2019 07:20), Max: 37.4 (01 Jul 2019 00:39)    T(F): 98.1 (01 Jul 2019 07:20), Max: 99.4 (01 Jul 2019 00:39)    HR: 87 (01 Jul 2019 07:20) (85 - 89)    BP: 140/85 (01 Jul 2019 07:20) (110/64 - 140/85)    BP(mean): --    ABP: --    ABP(mean): --    RR: 19 (01 Jul 2019 07:20) (18 - 19)    SpO2: 97% (30 Jun 2019 21:22) (97% - 97%)        PHYSICAL EXAM:        GENERAL: NAD    NERVOUS SYSTEM:  Alert & Oriented X3, Good concentration; Motor Strength 5/5 B/L upper and lower extremities; DTRs 2+ intact and symmetric    CHEST/LUNG: Clear to percussion bilaterally; No rales, rhonchi, wheezing, or rubs    HEART: s1/s2 audible    ABDOMEN: Soft, Nontender, Nondistended; Bowel sounds present    EXTREMITIES:  no edema        time spent 45 minutes

## 2019-07-01 NOTE — SBIRT NOTE ADULT - NSSBIRTDRGBRIEFINTDET_GEN_A_CORE
Patient reports that he uses THC daily and also reports that he has a medical Marijuana card.  Pt reports that he feels his use is not a problem and is not interested in stoping using it at this time.  Pt was educated on negative effects of Marijuana and given information.  Pt accepted the information.  Pt reported no other SW needs at this time.

## 2019-07-01 NOTE — ADVANCED PRACTICE NURSE CONSULT - RECOMMEDATIONS
continue diabetes self management education  pls consider dc pt on nph vial and syringe 12 units in am 12 units in pm                                  regular insulin 7 units 15 mins before meals  a box of vail and syringe  cc- pls set fu appointment w [facility] to improve glycemic control         all meds from vivo

## 2019-07-01 NOTE — DISCHARGE NOTE PROVIDER - PROVIDER TOKENS
PROVIDER:[TOKEN:[[id number]]],FREE:[LAST:[[facility]],PHONE:[(   )    -],FAX:[(   )    -]]

## 2019-07-01 NOTE — DISCHARGE NOTE PROVIDER - NSDCCPCAREPLAN_GEN_ALL_CORE_FT
PRINCIPAL DISCHARGE DIAGNOSIS  Diagnosis: DKA (diabetic ketoacidosis)  Assessment and Plan of Treatment: resolved  c/w Insulin as prescribed   medication compliance      SECONDARY DISCHARGE DIAGNOSES  Diagnosis: DM (diabetes mellitus)  Assessment and Plan of Treatment: as above    Diagnosis: REGINO (acute kidney injury)  Assessment and Plan of Treatment: resolved

## 2019-07-01 NOTE — DISCHARGE NOTE NURSING/CASE MANAGEMENT/SOCIAL WORK - NSDCFUADDAPPT_GEN_ALL_CORE_FT
You have an Appointment at the [facility] in [place] [address]  Friday 7/5/19 @ 9:45 with Joshua Valdivia  [phone]

## 2019-07-01 NOTE — DISCHARGE NOTE PROVIDER - CARE PROVIDER_API CALL
Rafaela So (DO)  EndocrinologyMetabDiabetes; Internal Medicine  [employer] [place] [address]  Phone: [phone]  Fax: [phone]  Follow Up Time:     [facility] clinic,   Phone: (   )    -  Fax: (   )    -  Follow Up Time:

## 2019-07-01 NOTE — DISCHARGE NOTE NURSING/CASE MANAGEMENT/SOCIAL WORK - NSDCDPATPORTLINK_GEN_ALL_CORE
You can access the [facility] Patient Portal, offered by [facility], by registering with the following website: [email or web address]

## 2023-01-06 NOTE — H&P ADULT - GEN GEN HX ROS MEA POS PC
This number is not correct in either system and did not realize until a different person answered the phone and said wrong number. malaise

## 2024-05-20 NOTE — ADVANCED PRACTICE NURSE CONSULT - ASSESSMENT
1. US and labs soon - I will message you the results in [facility]  2. Fibroscan to look for fat or scar tissue in the liver with return to clinic   3. Will check immunity markers for Hepatitis A and B and arrange for vaccination if needed  4. Labs before return to clinic to  check for multiple causes of liver disease. These labs will release to you as soon as they are resulted but we will discuss them in detail at your upcoming visit to discuss what the lab results mean.   5.  Follow up in 4 months with fibroscan same day     These things are important to improve fatty liver:  Limit alcohol consumption  2 Weight loss goal of 10 lbs. [facility] offers benefits to patients so let me know if you want a referral to the [facility] gym. Also, [facility] has dieticians that can create a weight loss plan. If you are interested let me know and I can place a referral. If so, contact the dietician team at [facility] at email [email or web address] or call [phone] to get scheduled. They do offer video visits   3. Low carb/sugar and high protein diet (~70 g/day of protein).Try to limit your carb intake to LESS than 30-45 grams of carbs with a meal or LESS than 5-10 grams with any snack (total of any snack foods eaten during that time). Use Set.fm Pal or Lose It chandra to add up your carbs through the day. Do NOT drink any beverages with calories or carbs (this can lead to high blood sugar and weight gain). The main thing to focus on is low calorie, high protein, low carb)  4. Exercise, 5 days per week, 30 minutes per day, as tolerated     pt is a+ox3 c/0 pain. pt states he suffers from pt is a+ox3 c/0 pain. pt states he suffers from gastro paresis, and when he gets it he stop taking the insulin. he is not consistent w meals. he has type 1 for >20 years he does nt have insurance and he buys his diabetes supply from [facility], he also reuse syringes. pt agreed to fu w [facility] to improve glycemic control. discussed w pt 5x injection vs 2x injection w the benefit of having basal insulin on bord even when not eating. pt verbalized understanding and agreed.